# Patient Record
Sex: MALE | URBAN - METROPOLITAN AREA
[De-identification: names, ages, dates, MRNs, and addresses within clinical notes are randomized per-mention and may not be internally consistent; named-entity substitution may affect disease eponyms.]

---

## 2020-08-18 ENCOUNTER — TELEPHONE (OUTPATIENT)
Dept: WOUND CARE | Facility: CLINIC | Age: 65
End: 2020-08-18

## 2020-08-18 NOTE — TELEPHONE ENCOUNTER
Macario needs an appt for his left arm wound.  He is referred by the [facility].    He will send photos today.

## 2020-08-30 ENCOUNTER — HOSPITAL ENCOUNTER (EMERGENCY)
Dept: HOSPITAL 11 - JP.ED | Age: 65
Discharge: HOME | End: 2020-08-30
Payer: MEDICARE

## 2020-08-30 DIAGNOSIS — Z79.899: ICD-10-CM

## 2020-08-30 DIAGNOSIS — S80.212A: ICD-10-CM

## 2020-08-30 DIAGNOSIS — M79.89: ICD-10-CM
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
## 2020-08-30 DIAGNOSIS — F17.210: ICD-10-CM

## 2020-08-30 DIAGNOSIS — S22.42XA: Primary | ICD-10-CM

## 2020-08-30 DIAGNOSIS — Z88.1: ICD-10-CM

## 2020-08-30 DIAGNOSIS — J44.9: ICD-10-CM

## 2020-08-30 DIAGNOSIS — Z79.82: ICD-10-CM

## 2020-08-30 DIAGNOSIS — I10: ICD-10-CM

## 2020-08-30 PROCEDURE — 36415 COLL VENOUS BLD VENIPUNCTURE: CPT

## 2020-08-30 PROCEDURE — 85027 COMPLETE CBC AUTOMATED: CPT

## 2020-08-30 PROCEDURE — 73110 X-RAY EXAM OF WRIST: CPT

## 2020-08-30 PROCEDURE — 80048 BASIC METABOLIC PNL TOTAL CA: CPT

## 2020-08-30 PROCEDURE — 93010 ELECTROCARDIOGRAM REPORT: CPT

## 2020-08-30 PROCEDURE — 71250 CT THORAX DX C-: CPT

## 2020-08-30 PROCEDURE — 93005 ELECTROCARDIOGRAM TRACING: CPT

## 2020-08-30 PROCEDURE — 99284 EMERGENCY DEPT VISIT MOD MDM: CPT

## 2020-08-30 PROCEDURE — 99283 EMERGENCY DEPT VISIT LOW MDM: CPT

## 2020-08-30 NOTE — EDM.PDOC
ED HPI GENERAL MEDICAL PROBLEM





- General


Chief Complaint: General


Stated Complaint: RIB PAIN AND SOB FROM FALL


Time Seen by Provider: 08/30/20 11:55


Source of Information: Reports: Patient


History Limitations: Reports: No Limitations





- History of Present Illness


INITIAL COMMENTS - FREE TEXT/NARRATIVE: 





64-year-old male with a history of COPD and hypertension presents emergency d

epartment with left anterior chest pain and left wrist discomfort.  These were 

triggered by a fall on Thursday night.  He landed on the left side of his body 

after tripping on a rock.  He landed on hard cement.  He fell from ending level.

 It is increased with any type of movement or deep breathing.  It is moderately 

severe.  Pain does not radiate.  Denies any other injuries.  He is a smoker and 

has a history of COPD.  He drinks daily and his last alcohol intake was last 

night.  Denies a history of withdrawal.  Not drinking on the night that he was 

injured.  Not have any immunocompromising conditions.  Is not on anticoagulants.


  ** Left Chest


Pain Score (Numeric/FACES): 10





- Related Data


                                    Allergies











Allergy/AdvReac Type Severity Reaction Status Date / Time


 


amoxicillin Allergy  Rash Verified 08/30/20 11:20











Home Meds: 


                                    Home Meds





Albuterol Sulfate [Albuterol Sulfate Hfa] 1 applic IH Q4HR PRN 08/30/20 

[History]


Albuterol [Proventil Neb Soln] 2.5 mg IH Q4HR PRN 08/30/20 [History]


Aspirin [Halfprin] 162 mg PO DAILY 08/30/20 [History]


Lisinopril/Hydrochlorothiazide [Lisinopril-Hctz 10-12.5 mg Tab] 1 each PO DAILY 

08/30/20 [History]











Past Medical History


HEENT History: Reports: Cataract


Cardiovascular History: Reports: Hypertension


Respiratory History: Reports: COPD


Gastrointestinal History: Reports: GERD, Other (See Below)


Other Gastrointestinal History: elevated liver enzymes


Musculoskeletal History: Reports: Fracture


Psychiatric History: Reports: Addiction, Depression


Hematologic History: Reports: Anemia


Dermatologic History: Reports: Eczema, Other (See Below)


Other Dermatologic History: tinea pedis.  warts





- Infectious Disease History


Infectious Disease History: Reports: C-Difficile, Measles





- Past Surgical History


HEENT Surgical History: Reports: Cataract Surgery


GI Surgical History: Reports: Colonoscopy, Polypectomy





Social & Family History





- Tobacco Use


Smoking Status *Q: Current Every Day Smoker


Years of Tobacco use: 46


Packs/Tins Daily: 1





- Recreational Drug Use


Recreational Drug Use: No





ED ROS GENERAL





- Review of Systems


Review Of Systems: See Below


Constitutional: Reports: No Symptoms


HEENT: Reports: No Symptoms


Respiratory: Reports: Shortness of Breath.  Denies: Cough


Cardiovascular: Reports: No Symptoms


GI/Abdominal: Denies: Nausea, Vomiting


: Reports: No Symptoms


Musculoskeletal: Reports: Other (Wrist pain)


Skin: Reports: Other (Abrasion left knee)


Neurological: Denies: Confusion, Dizziness, Numbness





ED EXAM, GENERAL





- Physical Exam


Exam: See Below


Exam Limited By: No Limitations


General Appearance: Alert, WD/WN, Moderate Distress


Head: Atraumatic


Neck: Normal Inspection, Supple, Non-Tender, Full Range of Motion


Respiratory/Chest: No Respiratory Distress, Other (Decreased sounds on the left 

side.)


Cardiovascular: Regular Rate, Rhythm, No Gallop, No Murmur


Extremities: Other (Is swelling over the left wrist.  No other deformities are 

present.)


Neurological: Alert, Oriented, Normal Cognition, No Motor/Sensory Deficits


Psychiatric: Normal Affect


Skin Exam: Other (Abrasion left anterior knee)





Course





- Vital Signs


Text/Narrative:: 





Presented to the ED with left anterior chest pain as well as left wrist 

discomfort.  This started after a fall on Thursday.  He has a white count of 

16,000 which which is elevated but is secondary to pain.  A CT scan of the chest

 with out contrast report is pending.  He had x-ray of the left wrist which was 

interpreted by me.  Shows no signs of fracture dislocation.  Is given a left 

wrist splint.  He has an abrasion to the left knee.  He was given a tetanus 

booster.  His basic metabolic profile shows hyponatremia of 130.  He drinks a 

fair amount of fluids daily and was advised to cut back on it and his sodium 

levels up.  His pain in the ED was treated with fentanyl initially but changed 

to Dilaudid when his pain persisted.  He was given Zofran for nausea and a liter

 of IV fluids.





Patient has multiple rib fractures involving the sixth or ninth rib on the left.

  I have discussed this case with the hospitalist service.  The patient is being

 discharged home with a rib belt and oxycodone/325 mg 1 or 2 tablets every 6 

hours as needed for pain.  He can take ibuprofen for breakthrough pain.  He will

 follow-up with primary care in 2 to 3 days.  Patient will return here if he has

 any increased problems.


Last Recorded V/S: 


                                Last Vital Signs











Temp  35.8 C L  08/30/20 11:27


 


Pulse  108 H  08/30/20 13:19


 


Resp  16   08/30/20 13:19


 


BP  130/94 H  08/30/20 13:19


 


Pulse Ox  93 L  08/30/20 13:19














- Orders/Labs/Meds


Orders: 


                               Active Orders 24 hr











 Category Date Time Status


 


 EKG Documentation Completion [RC] ASDIRECTED Care  08/30/20 11:57 Active


 


 Wrist Comp Min 3V Lt [CR] Stat Exams  08/30/20 11:53 Taken


 


 HYDROmorphone [Dilaudid] Med  08/30/20 13:07 Active





 1 mg IVPUSH Q1H PRN   


 


 Sodium Chloride 0.9% [Normal Saline] 1,000 ml Med  08/30/20 12:00 Active





 IV ASDIRECTED   


 


 fentaNYL [Sublimaze] Med  08/30/20 11:51 Active





 50 mcg IVPUSH Q6H PRN   


 


 DME for Discharge [COMM] Stat Oth  08/30/20 13:09 Ordered


 


 EKG 12 Lead [EK] Routine Ther  08/30/20 11:56 Ordered








                                Medication Orders





Fentanyl (Sublimaze)  50 mcg IVPUSH Q6H PRN


   PRN Reason: Pain (severe 7-10)


   Last Admin: 08/30/20 12:15  Dose: 50 mcg


   Documented by: GABE


Hydromorphone HCl (Dilaudid)  1 mg IVPUSH Q1H PRN


   PRN Reason: Pain


   Last Admin: 08/30/20 13:16  Dose: 1 mg


   Documented by: PREILOR


Sodium Chloride (Normal Saline)  1,000 mls @ 1,000 mls/hr IV ASDIRECTED ROBERTO


   Last Admin: 08/30/20 12:16  Dose: 1,000 mls/hr


   Documented by: GABE








Labs: 


                                Laboratory Tests











  08/30/20 08/30/20 Range/Units





  12:12 12:12 


 


WBC   16.4 H  (4.5-11.0)  K/uL


 


RBC   4.29 L  (4.30-5.90)  M/uL


 


Hgb   14.9  (12.0-15.0)  g/dL


 


Hct   43.8  (40.0-54.0)  %


 


MCV   102 H  (80-98)  fL


 


MCH   35 H  (27-31)  pg


 


MCHC   34  (32-36)  %


 


Plt Count   394  (150-400)  K/uL


 


Sodium  130 L   (140-148)  mmol/L


 


Potassium  4.2   (3.6-5.2)  mmol/L


 


Chloride  94 L   (100-108)  mmol/L


 


Carbon Dioxide  22   (21-32)  mmol/L


 


Anion Gap  18.2 H   (5.0-14.0)  mmol/L


 


BUN  11   (7-18)  mg/dL


 


Creatinine  1.0   (0.8-1.3)  mg/dL


 


Est Cr Clr Drug Dosing  78.44   mL/min


 


Estimated GFR (MDRD)  > 60   (>60)  


 


Glucose  94   ()  mg/dL


 


Calcium  9.5   (8.5-10.1)  mg/dL











Meds: 


Medications











Generic Name Dose Route Start Last Admin





  Trade Name Freq  PRN Reason Stop Dose Admin


 


Fentanyl  50 mcg  08/30/20 11:51  08/30/20 12:15





  Sublimaze  IVPUSH   50 mcg





  Q6H PRN   Administration





  Pain (severe 7-10)  


 


Hydromorphone HCl  1 mg  08/30/20 13:07  08/30/20 13:16





  Dilaudid  IVPUSH   1 mg





  Q1H PRN   Administration





  Pain  


 


Sodium Chloride  1,000 mls @ 1,000 mls/hr  08/30/20 12:00  08/30/20 12:16





  Normal Saline  IV   1,000 mls/hr





  ASDIRECTED ROBERTO   Administration














Discontinued Medications














Generic Name Dose Route Start Last Admin





  Trade Name Freq  PRN Reason Stop Dose Admin


 


Ketorolac Tromethamine  15 mg  08/30/20 14:08 





  Toradol  IM  08/30/20 14:09 





  ONETIME ONE  


 


Ketorolac Tromethamine  15 mg  08/30/20 14:11 





  Toradol  IVPUSH  08/30/20 14:12 





  ONETIME ONE  


 


Ondansetron HCl  4 mg  08/30/20 11:52  08/30/20 12:15





  Zofran  IVPUSH  08/30/20 11:53  4 mg





  ONETIME ONE   Administration














Departure





- Departure


Time of Disposition: 14:15


Disposition: Home, Self-Care 01


Condition: Good, Fair


Clinical Impression: 


 Multiple rib fractures involving four or more ribs








- Discharge Information


*PRESCRIPTION DRUG MONITORING PROGRAM REVIEWED*: No


*COPY OF PRESCRIPTION DRUG MONITORING REPORT IN PATIENT CARITO: No


Instructions:  Rib Fracture, Easy-to-Read


Referrals: 


PCP,None [Primary Care Provider] - 


Forms:  ED Department Discharge


Additional Instructions: 


Take the prescription pain medication as directed.  Use ibuprofen if you have 

breakthrough pain.  Use a rib belt during the daytime but remove it at night 

when you sleep.  Try sleeping in a recliner.  Follow-up with your primary care 

doctor in 2 to 3 days in the clinic.  Return to the ED if your pain persists 

without the.





Sepsis Event Note (ED)





- Evaluation


Sepsis Screening Result: No Definite Risk





- Focused Exam


Vital Signs: 


                                   Vital Signs











  Temp Pulse Resp BP Pulse Ox


 


 08/30/20 13:19   108 H  16  130/94 H  93 L


 


 08/30/20 11:27  35.8 C L  120 H  16  143/85 H  95


 


 08/30/20 11:09  35.8 C L  120 H  16  143/85 H  95














- My Orders


Last 24 Hours: 


My Active Orders





08/30/20 11:51


fentaNYL [Sublimaze]   50 mcg IVPUSH Q6H PRN 





08/30/20 11:53


Wrist Comp Min 3V Lt [CR] Stat 





08/30/20 11:56


EKG 12 Lead [EK] Routine 





08/30/20 11:57


EKG Documentation Completion [RC] ASDIRECTED 





08/30/20 12:00


Sodium Chloride 0.9% [Normal Saline] 1,000 ml IV ASDIRECTED 





08/30/20 13:07


HYDROmorphone [Dilaudid]   1 mg IVPUSH Q1H PRN 





08/30/20 13:09


DME for Discharge [COMM] Stat 














- Assessment/Plan


Last 24 Hours: 


My Active Orders





08/30/20 11:51


fentaNYL [Sublimaze]   50 mcg IVPUSH Q6H PRN 





08/30/20 11:53


Wrist Comp Min 3V Lt [CR] Stat 





08/30/20 11:56


EKG 12 Lead [EK] Routine 





08/30/20 11:57


EKG Documentation Completion [RC] ASDIRECTED 





08/30/20 12:00


Sodium Chloride 0.9% [Normal Saline] 1,000 ml IV ASDIRECTED 





08/30/20 13:07


HYDROmorphone [Dilaudid]   1 mg IVPUSH Q1H PRN 





08/30/20 13:09


DME for Discharge [COMM] Stat
Initial

## 2020-08-30 NOTE — CRLCT
INDICATION:



Injury from fall.



COMPARISON:



None.



TECHNIQUE:



CT chest without contrast.



FINDINGS:



Heart is normal in size. Mild to moderate coronary artery calcifications. 

No pericardial effusion. No enlarged lymph nodes identified in the chest. 

Hypoattenuating hepatic foci are incompletely characterized. 

Cholelithiasis. Bones are demineralized. Age-indeterminate mild compression 

fracture deformity of T11 vertebral body. Minimally displaced left 6th 

through 9th rib fractures. 



No focal lung consolidation, pleural effusion or pneumothorax. Linear left 

basilar subsegmental atelectasis/scarring. Faint patchy left upper lobe 

opacities (series 9, image 34). No suspicious pulmonary nodule or mass. 

Calcified granuloma in the right upper lobe.



IMPRESSION:



1. Left rib sixth through ninth rib fractures.



2. Patchy opacities in the left upper lobe which could be due to 

infection/inflammation.



3. Mild age-indeterminate compression fracture deformity of T11 vertebral 

body.



4. Cholelithiasis.



Please note that all CT scans at this facility use dose modulation, 

iterative reconstruction, and/or weight-based dosing when appropriate to 

reduce radiation dose to as low as reasonably achievable.



Dictated by John Villasenor MD @ Aug 30 2020  1:44PM



Signed by Dr. John Villasenor @ Aug 30 2020  1:55PM

## 2020-08-31 NOTE — CR
Wrist Comp Min 3V Lt

 

CLINICAL HISTORY: Injury

 

FINDINGS: There is no acute fracture or dislocation within the left wrist. There

are some soft tissue ossifications both dorsal and palmar in location.

 

Impression: Soft tissue calcifications

 

No fracture or dislocation

## 2021-04-28 ENCOUNTER — HOSPITAL ENCOUNTER (EMERGENCY)
Dept: HOSPITAL 11 - JP.ED | Age: 66
LOS: 1 days | Discharge: HOME | End: 2021-04-29
Payer: MEDICARE

## 2021-04-28 DIAGNOSIS — Z79.899: ICD-10-CM

## 2021-04-28 DIAGNOSIS — I25.2: ICD-10-CM

## 2021-04-28 DIAGNOSIS — J44.9: ICD-10-CM

## 2021-04-28 DIAGNOSIS — I10: ICD-10-CM

## 2021-04-28 DIAGNOSIS — Z72.0: ICD-10-CM

## 2021-04-28 DIAGNOSIS — Z79.82: ICD-10-CM
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
## 2021-04-28 DIAGNOSIS — S22.42XA: Primary | ICD-10-CM

## 2021-04-28 PROCEDURE — 93005 ELECTROCARDIOGRAM TRACING: CPT

## 2021-04-28 PROCEDURE — 84484 ASSAY OF TROPONIN QUANT: CPT

## 2021-04-28 PROCEDURE — 85025 COMPLETE CBC W/AUTO DIFF WBC: CPT

## 2021-04-28 PROCEDURE — 99285 EMERGENCY DEPT VISIT HI MDM: CPT

## 2021-04-28 PROCEDURE — 80053 COMPREHEN METABOLIC PANEL: CPT

## 2021-04-28 PROCEDURE — 36415 COLL VENOUS BLD VENIPUNCTURE: CPT

## 2021-04-28 PROCEDURE — 71046 X-RAY EXAM CHEST 2 VIEWS: CPT

## 2021-04-28 PROCEDURE — 96374 THER/PROPH/DIAG INJ IV PUSH: CPT

## 2021-04-29 NOTE — EDM.PDOC
ED HPI GENERAL MEDICAL PROBLEM





- General


Chief Complaint: Chest Pain


Stated Complaint: FALL VIA NORTH


Time Seen by Provider: 04/29/21 00:14


Source of Information: Reports: Patient, RN Notes Reviewed


History Limitations: Reports: No Limitations





- History of Present Illness


INITIAL COMMENTS - FREE TEXT/NARRATIVE: 





65-year-old gentleman presents to the emergency department today via EMS 

services, he states he has had chest pain since early this morning he is 

concerned about his heart he also has chest pain from a fall he does use alcohol

daily he states he fell earlier today and landed on his chest he does have a 

history of falling a couple weeks ago unfortunately landed in the same spot 

complains of shortness of breath especially with deep breath chest pain no 

nausea vomiting no diaphoresis


Treatments PTA: Reports: See EMS Report


  ** Left Chest


Pain Score (Numeric/FACES): 5





- Related Data


                                    Allergies











Allergy/AdvReac Type Severity Reaction Status Date / Time


 


amoxicillin Allergy  Rash Verified 04/29/21 00:08











Home Meds: 


                                    Home Meds





Albuterol Sulfate [Albuterol Sulfate Hfa] 1 applic IH Q4HR PRN 08/30/20 

[History]


Albuterol [Proventil Neb Soln] 2.5 mg IH Q4HR PRN 08/30/20 [History]


Aspirin [Halfprin] 162 mg PO DAILY 08/30/20 [History]


Lisinopril/Hydrochlorothiazide [Lisinopril-Hctz 10-12.5 mg Tab] 1 each PO DAILY 

08/30/20 [History]











Past Medical History


HEENT History: Reports: Cataract


Cardiovascular History: Reports: Hypertension, MI (No official diagnosis patient

 believes this happened in the past)


Respiratory History: Reports: COPD


Gastrointestinal History: Reports: GERD, Other (See Below)


Other Gastrointestinal History: elevated liver enzymes


Musculoskeletal History: Reports: Fracture


Psychiatric History: Reports: Addiction, Depression


Hematologic History: Reports: Anemia


Dermatologic History: Reports: Eczema, Other (See Below)


Other Dermatologic History: tinea pedis.  warts





- Infectious Disease History


Infectious Disease History: Reports: C-Difficile, Measles





- Past Surgical History


HEENT Surgical History: Reports: Cataract Surgery


GI Surgical History: Reports: Colonoscopy, Polypectomy





Social & Family History





- Tobacco Use


Tobacco Use Status *Q: Current Every Day Tobacco User


Years of Tobacco use: 53


Packs/Tins Daily: 1





- Caffeine Use


Caffeine Use: Reports: None





- Alcohol Use


Days Per Week of Alcohol Use: 7


Number of Drinks Per Day: 5


Total Drinks Per Week: 35


Date of Last Drink: 04/29/21





- Recreational Drug Use


Recreational Drug Use: Yes


Drug Use in Last 12 Months: Yes


Recreational Drug Type: Reports: Marijuana/Hashish


Recreational Drug Use Frequency: Daily





ED ROS GENERAL





- Review of Systems


Review Of Systems: See Below


Constitutional: Reports: No Symptoms


HEENT: Reports: No Symptoms


Respiratory: Reports: Shortness of Breath


Cardiovascular: Reports: Chest Pain, Dyspnea on Exertion


GI/Abdominal: Reports: No Symptoms





ED EXAM, GENERAL





- Physical Exam


Exam: See Below


Exam Limited By: No Limitations


General Appearance: Alert, WD/WN, No Apparent Distress


Respiratory/Chest: No Respiratory Distress, Lungs Clear, Normal Breath Sounds, 

No Accessory Muscle Use, Other (Anterior chest is tender predominantly on the 

left side)


Cardiovascular: Regular Rate, Rhythm, No Murmur


GI/Abdominal: Soft, Non-Tender





Course





- Vital Signs


Last Recorded V/S: 


                                Last Vital Signs











Temp  97.5 F   04/29/21 00:26


 


Pulse  66   04/29/21 00:48


 


Resp  18   04/29/21 00:48


 


BP  91/51 L  04/29/21 00:48


 


Pulse Ox  97   04/29/21 00:48














- Orders/Labs/Meds


Orders: 


                               Active Orders 24 hr











 Category Date Time Status


 


 Cardiac Monitoring [RC] .As Directed Care  04/29/21 00:19 Active


 


 EKG Documentation Completion [RC] ASDIRECTED Care  04/29/21 00:20 Active


 


 Chest 2V [CR] Stat Exams  04/29/21 00:20 Taken


 


 Sodium Chloride 0.9% [Normal Saline] 1,000 ml Med  04/29/21 01:16 Active





 IV .BOLUS   


 


 EKG 12 Lead [EK] Stat Ther  04/29/21 00:20 Ordered








                                Medication Orders





Sodium Chloride (Normal Saline)  1,000 mls @ 999 mls/hr IV .BOLUS ONE


   Stop: 04/29/21 02:16


   Last Admin: 04/29/21 01:18  Dose: 999 mls/hr


   Documented by: MELANY








Labs: 


                                Laboratory Tests











  04/29/21 04/29/21 Range/Units





  00:30 00:30 


 


WBC  10.8   (4.5-11.0)  K/uL


 


RBC  3.40 L   (4.30-5.90)  M/uL


 


Hgb  11.7 L D   (12.0-15.0)  g/dL


 


Hct  34.4 L   (40.0-54.0)  %


 


MCV  101 H   (80-98)  fL


 


MCH  34 H   (27-31)  pg


 


MCHC  34   (32-36)  %


 


Plt Count  374   (150-400)  K/uL


 


Neut % (Auto)  73 H   (36-66)  %


 


Lymph % (Auto)  16 L   (24-44)  %


 


Mono % (Auto)  11 H   (2-6)  %


 


Eos % (Auto)  1 L   (2-4)  %


 


Baso % (Auto)  0   (0-1)  %


 


Sodium   132 L  (140-148)  mmol/L


 


Potassium   3.5 L  (3.6-5.2)  mmol/L


 


Chloride   96 L  (100-108)  mmol/L


 


Carbon Dioxide   22  (21-32)  mmol/L


 


Anion Gap   17.5 H  (5.0-14.0)  mmol/L


 


BUN   11  (7-18)  mg/dL


 


Creatinine   1.0  (0.8-1.3)  mg/dL


 


Est Cr Clr Drug Dosing   77.39  mL/min


 


Estimated GFR (MDRD)   > 60  (>60)  


 


Glucose   116 H  ()  mg/dL


 


Calcium   8.4 L  (8.5-10.1)  mg/dL


 


Total Bilirubin   0.6  (0.2-1.0)  mg/dL


 


AST   71 H  (15-37)  U/L


 


ALT   52  (12-78)  U/L


 


Alkaline Phosphatase   156 H  ()  U/L


 


Troponin I   < 0.017  (0.000-0.056)  ng/mL


 


Total Protein   6.6  (6.4-8.2)  g/dL


 


Albumin   3.0 L  (3.4-5.0)  g/dL


 


Globulin   3.6 H  (2.3-3.5)  g/dL


 


Albumin/Globulin Ratio   0.8 L  (1.2-2.2)  











Meds: 


Medications











Generic Name Dose Route Start Last Admin





  Trade Name Freq  PRN Reason Stop Dose Admin


 


Sodium Chloride  1,000 mls @ 999 mls/hr  04/29/21 01:16  04/29/21 01:18





  Normal Saline  IV  04/29/21 02:16  999 mls/hr





  .BOLUS ONE   Administration














Discontinued Medications














Generic Name Dose Route Start Last Admin





  Trade Name Freq  PRN Reason Stop Dose Admin


 


Ketorolac Tromethamine  30 mg  04/29/21 00:20  04/29/21 00:25





  Ketorolac 30 Mg/Ml Sdv  IVPUSH  04/29/21 00:21  30 mg





  ONETIME ONE   Administration














Departure





- Departure


Time of Disposition: 02:12


Disposition: Home, Self-Care 01


Condition: Fair


Clinical Impression: 


 Multiple rib fractures involving four or more ribs





Instructions:  Rib Fracture, Easy-to-Read


Referrals: 


PCP,None [Primary Care Provider] - 


Forms:  ED Department Discharge


Additional Instructions: 


Use ibuprofen for baseline pain control use hydrocodone for breakthrough pain,  

please followup with your primary care provider in 3-5 days if not better, 

please call return to the emergency department with worsening of symptoms.





Sepsis Event Note (ED)





- Focused Exam


Vital Signs: 


                                   Vital Signs











  Temp Pulse Resp BP Pulse Ox


 


 04/29/21 00:48   66  18  91/51 L  97


 


 04/29/21 00:28   72  18  90/52 L 


 


 04/29/21 00:26  97.5 F  76  15  96/56 L  98


 


 04/29/21 00:23  97.5 F  76  15  96/56 L  98














- My Orders


Last 24 Hours: 


My Active Orders





04/29/21 00:19


Cardiac Monitoring [RC] .As Directed 





04/29/21 00:20


EKG Documentation Completion [RC] ASDIRECTED 


Chest 2V [CR] Stat 


EKG 12 Lead [EK] Stat 





04/29/21 01:16


Sodium Chloride 0.9% [Normal Saline] 1,000 ml IV .BOLUS 














- Assessment/Plan


Last 24 Hours: 


My Active Orders





04/29/21 00:19


Cardiac Monitoring [RC] .As Directed 





04/29/21 00:20


EKG Documentation Completion [RC] ASDIRECTED 


Chest 2V [CR] Stat 


EKG 12 Lead [EK] Stat 





04/29/21 01:16


Sodium Chloride 0.9% [Normal Saline] 1,000 ml IV .BOLUS 











Plan: 





Assessment





Acuity = acute





Site and laterality = chest wall pain question rib fractures





Etiology  = secondary to fall complicated by alcohol





Manifestations = none





Location of injury =  Home





Lab values = hemoglobin low 11.7 consistent microchromic anemia sodium low at 

132 consistent hyponatremia troponin was negative EKG demonstrates sinus rhythm 

no ST elevations or depressions chest x-ray question area of rib fracture left 

side inferior rib area anterior chest pressure read radiologist pending





Plan


He had good relief with Toradol provided in the ED prescription written for 

hydrocodone 5/325 1 tab p.o. 3 times daily as needed total #6 follow-up primary 

care 3 to 5 days if not better

















 This note was dictated using dragon voice recognition software please call with

any questions on syntax or grammar.

## 2021-04-29 NOTE — CR
CHEST: 2 view

 

CLINICAL HISTORY:Chest pain

 

COMPARISON:August 2020

 

FINDINGS:  Heart size pulmonary vascularity are normal. There are streaky

density in the left lower lobe. Some of this is present on a previous CT from

2020. This appears to increase slightly. Lungs are generally hyperaerated. There

are some old left lower rib fractures.

 

IMPRESSION: Patchy airspace disease in the left lower lobe. Some of this was

seen on the CT in August 2020. This has increased. Superimposed infiltrate is

not excluded

 

Old left rib fractures

 

COPD